# Patient Record
Sex: MALE | Race: WHITE | NOT HISPANIC OR LATINO | URBAN - METROPOLITAN AREA
[De-identification: names, ages, dates, MRNs, and addresses within clinical notes are randomized per-mention and may not be internally consistent; named-entity substitution may affect disease eponyms.]

---

## 2017-05-28 ENCOUNTER — EMERGENCY (EMERGENCY)
Facility: HOSPITAL | Age: 27
LOS: 1 days | Discharge: HOME | End: 2017-05-28
Attending: EMERGENCY MEDICINE | Admitting: EMERGENCY MEDICINE
Payer: COMMERCIAL

## 2017-05-28 VITALS
RESPIRATION RATE: 16 BRPM | HEART RATE: 84 BPM | WEIGHT: 179.9 LBS | DIASTOLIC BLOOD PRESSURE: 76 MMHG | TEMPERATURE: 98 F | OXYGEN SATURATION: 100 % | SYSTOLIC BLOOD PRESSURE: 128 MMHG
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
## 2017-05-28 DIAGNOSIS — S69.92XA UNSPECIFIED INJURY OF LEFT WRIST, HAND AND FINGER(S), INITIAL ENCOUNTER: ICD-10-CM
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
## 2017-05-28 PROCEDURE — 99283 EMERGENCY DEPT VISIT LOW MDM: CPT | Mod: 25

## 2017-05-28 PROCEDURE — 99284 EMERGENCY DEPT VISIT MOD MDM: CPT

## 2017-05-28 PROCEDURE — 73130 X-RAY EXAM OF HAND: CPT | Mod: 26,LT

## 2017-05-28 PROCEDURE — 90471 IMMUNIZATION ADMIN: CPT

## 2017-05-28 PROCEDURE — 90715 TDAP VACCINE 7 YRS/> IM: CPT

## 2017-05-28 PROCEDURE — 10120 INC&RMVL FB SUBQ TISS SMPL: CPT | Mod: F2

## 2017-05-28 PROCEDURE — 10120 INC&RMVL FB SUBQ TISS SMPL: CPT

## 2017-05-28 PROCEDURE — 73130 X-RAY EXAM OF HAND: CPT

## 2017-05-28 RX ORDER — TETANUS TOXOID, REDUCED DIPHTHERIA TOXOID AND ACELLULAR PERTUSSIS VACCINE, ADSORBED 5; 2.5; 8; 8; 2.5 [IU]/.5ML; [IU]/.5ML; UG/.5ML; UG/.5ML; UG/.5ML
0.5 SUSPENSION INTRAMUSCULAR ONCE
Qty: 0 | Refills: 0 | Status: COMPLETED | OUTPATIENT
Start: 2017-05-28 | End: 2017-05-28

## 2017-05-28 RX ADMIN — TETANUS TOXOID, REDUCED DIPHTHERIA TOXOID AND ACELLULAR PERTUSSIS VACCINE, ADSORBED 0.5 MILLILITER(S): 5; 2.5; 8; 8; 2.5 SUSPENSION INTRAMUSCULAR at 20:39

## 2017-05-28 RX ADMIN — Medication 1 TABLET(S): at 20:39

## 2017-05-28 NOTE — ED PROVIDER NOTE - CHPI ED SYMPTOMS NEG
no bleeding at site/no bleeding/no drainage/no vomiting/no redness/no red streaks/no chills/no purulent drainage/no fever

## 2017-05-28 NOTE — ED PROVIDER NOTE - PLAN OF CARE
Return to the ED for any new or worsening symptoms  Take your medication as prescribed  Finish all antibiotics prescribed  Keep wound clean and dry  Apply Bacitracin to affected site 2 times a day   Follow up for a recheck in 2-3 days

## 2017-05-28 NOTE — ED PROVIDER NOTE - ATTENDING CONTRIBUTION TO CARE
Pt is a 27 yo male who presents to the ED with a cc of fish hook to left hand 3rd digit.  Pt reports that he was cleaning up after a fishing trip and hanging his hooks on the wall when his hand slipped and he caught it on a fish hook.  Pt is right hand dominant.  Denies numbness or tingling in left hand.  Unsure of date of last Tetanus.  On exam pt resting comfortably in bed NAD.  Heart RRR, lungs CTA, abd soft NT/ND.  Left hand fish hook noted to distal aspect of 3rd digit lateral palmar aspect.  No bleeding.  Sensation intact to distal aspect of finger with capillary refill less then 2 seconds.  FROM +radial pulse.  Agree with above plan of care

## 2017-05-28 NOTE — ED ADULT NURSE NOTE - OBJECTIVE STATEMENT
Pt presents to the ED s/p fishing hook in the left 3rd finger, + sensation, + capillary refill < 2 sec, skin warm and dry, + mobility.

## 2017-05-28 NOTE — ED PROVIDER NOTE - OBJECTIVE STATEMENT
25 y/o male presents to ED c/o fish hook stuck in left 3rd digit x 1 hour. States he accidentally got his finger stuck in the hook while fishing. Denies any other complaints. States he otherwise feels good. Denies n/v, f/c, chest pain, sob, numbness, tingling.

## 2017-05-28 NOTE — ED PROVIDER NOTE - MEDICAL DECISION MAKING DETAILS
tetanus today- risks and benefits discussed prior to administration of TDAP. fish hook removed in ED, xray r/o fb, bacitracin and sterile dressing applied in ED.  Please follow up with your Primary MD in 24-48 hr. Augmentin twice a day x 7 days. Keep area clean and dry, daily dressing changes, apply bacitracin. Watch for local signs of infection such as increased redness, warmth, swelling, discharge, pain. Return to ED immediately if condition worsens or development of nausea, vomiting, fever, chills, or diarrhea.  Seek immediate medical care for any new/worsening signs or symptoms.

## 2017-05-28 NOTE — ED PROVIDER NOTE - PHYSICAL EXAMINATION
Left hand- FROM all fingers. 3rd digit tendons intact. NVI, good distal pulses BL, capillary refill <2 sec BL, sensation intact throughout, 5/5 motor x 4 extremities. No redness, no warmth, no swelling, no bony tenderness, no local signs of infection.

## 2017-05-28 NOTE — ED PROVIDER NOTE - CARE PLAN
Principal Discharge DX:	Fish hook injury of finger of left hand, initial encounter  Instructions for follow-up, activity and diet:	Return to the ED for any new or worsening symptoms  Take your medication as prescribed  Finish all antibiotics prescribed  Keep wound clean and dry  Apply Bacitracin to affected site 2 times a day   Follow up for a recheck in 2-3 days

## 2019-04-11 ENCOUNTER — APPOINTMENT (OUTPATIENT)
Dept: INTERNAL MEDICINE | Facility: CLINIC | Age: 29
End: 2019-04-11

## 2020-05-04 ENCOUNTER — APPOINTMENT (OUTPATIENT)
Dept: INTERNAL MEDICINE | Facility: CLINIC | Age: 30
End: 2020-05-04
Payer: MEDICAID

## 2020-05-04 VITALS
BODY MASS INDEX: 24.38 KG/M2 | SYSTOLIC BLOOD PRESSURE: 105 MMHG | DIASTOLIC BLOOD PRESSURE: 66 MMHG | HEIGHT: 72 IN | WEIGHT: 180 LBS | OXYGEN SATURATION: 96 % | TEMPERATURE: 98.7 F | HEART RATE: 76 BPM

## 2020-05-04 DIAGNOSIS — L29.0 PRURITUS ANI: ICD-10-CM

## 2020-05-04 DIAGNOSIS — Z00.00 ENCOUNTER FOR GENERAL ADULT MEDICAL EXAMINATION W/OUT ABNORMAL FINDINGS: ICD-10-CM

## 2020-05-04 PROCEDURE — 99213 OFFICE O/P EST LOW 20 MIN: CPT

## 2020-05-04 PROCEDURE — G0444 DEPRESSION SCREEN ANNUAL: CPT

## 2020-05-04 RX ORDER — HYDROCORTISONE 25 MG/G
2.5 CREAM TOPICAL TWICE DAILY
Qty: 1 | Refills: 0 | Status: ACTIVE | COMMUNITY
Start: 2020-05-04 | End: 1900-01-01

## 2020-05-04 NOTE — PHYSICAL EXAM
[Normal] : no acute distress, well nourished, well developed and well-appearing [FreeTextEntry1] : small external hemmrhoid--no other findings

## 2020-05-04 NOTE — HISTORY OF PRESENT ILLNESS
[de-identified] : reports perianal irritation and pruritis\par tried otc topical tx [FreeTextEntry1] : anal pruritis

## 2020-08-17 ENCOUNTER — TRANSCRIPTION ENCOUNTER (OUTPATIENT)
Age: 30
End: 2020-08-17

## 2020-08-19 ENCOUNTER — APPOINTMENT (OUTPATIENT)
Dept: ORTHOPEDIC SURGERY | Facility: CLINIC | Age: 30
End: 2020-08-19
Payer: MEDICAID

## 2020-08-19 VITALS
HEART RATE: 58 BPM | WEIGHT: 185 LBS | SYSTOLIC BLOOD PRESSURE: 118 MMHG | HEIGHT: 72 IN | TEMPERATURE: 98.1 F | DIASTOLIC BLOOD PRESSURE: 75 MMHG | BODY MASS INDEX: 25.06 KG/M2

## 2020-08-19 DIAGNOSIS — Z86.11 PERSONAL HISTORY OF TUBERCULOSIS: ICD-10-CM

## 2020-08-19 DIAGNOSIS — M66.241 SPONTANEOUS RUPTURE OF EXTENSOR TENDONS, RIGHT HAND: ICD-10-CM

## 2020-08-19 DIAGNOSIS — Z80.9 FAMILY HISTORY OF MALIGNANT NEOPLASM, UNSPECIFIED: ICD-10-CM

## 2020-08-19 DIAGNOSIS — Z78.9 OTHER SPECIFIED HEALTH STATUS: ICD-10-CM

## 2020-08-19 PROCEDURE — 99203 OFFICE O/P NEW LOW 30 MIN: CPT

## 2020-08-19 RX ORDER — IBUPROFEN 200 MG/1
TABLET, COATED ORAL
Refills: 0 | Status: ACTIVE | COMMUNITY

## 2020-08-19 NOTE — ADDENDUM
[FreeTextEntry1] : I, Galilea Kirkland, acted solely as a scribe for Dr. Tinajero on this date 08/19/2020.

## 2020-08-19 NOTE — DISCUSSION/SUMMARY
[FreeTextEntry1] : He has findings consistent with a possible right middle finger radial sagittal band rupture.\par \par I had a discussion regarding today's visit, the diagnosis, and treatment recommendations / options. At this time, I recommended an MRI to evaluate for a middle finger radial sagittal band rupture. He will follow up after his MRI to review the results and discuss treatment recommendations. \par \par The patient has agreed to this plan of management and has expressed full understanding.  All questions were fully answered to the patient's satisfaction.\par \par Over 50% of the time spent with the patient was on counseling the patient on the above diagnosis, treatment plan and prognosis.

## 2020-08-19 NOTE — PHYSICAL EXAM
[de-identified] : He reports that he had x-rays and was told that there were no fractures or abnormalities. [de-identified] : - Constitutional: This is a male in no obvious distress.  \par - Psych: Patient is alert and oriented to person, place and time.  Patient has a normal mood and affect.\par - Cardiovascular: Normal pulses throughout the upper extremities.  No significant varicosities are noted in the upper extremities. \par - Neuro: Strength and sensation are intact throughout the upper extremities.  Patient has normal coordination.\par - Respiratory:  Patient exhibits no evidence of shortness of breath or difficulty breathing.\par - Skin: No rashes, lesions, or other abnormalities are noted in the upper extremities.\par \par ---\par  \par Examination of his right hand demonstrates mild swelling and ecchymosis palmarly.  There is mild tenderness in this region.  He has full extension of the digits with mild loss of terminal flexion.  There is no evidence of a trigger finger.  There is mild swelling along the middle finger MCP joint dorsally.  He has what appears to be ulnar subluxation of the central extensor tendon of the middle finger with terminal flexion.  There is no obvious tenderness along the radial sagittal band of the middle finger.  He is neurovascularly intact distally.

## 2020-08-19 NOTE — END OF VISIT
[FreeTextEntry3] : This note was written by Galilea Kirkland on 08/19/2020 acting solely as a scribe for Dr. Ashok Tinajero.\par  \par All medical record entries made by the Scribe were at my, Dr. Ashok Tinajero, direction and personally dictated by me on 08/19/2020. I have personally reviewed the chart and agree that the record accurately reflects my personal performance of the history, physical exam, assessment and plan.

## 2020-08-19 NOTE — HISTORY OF PRESENT ILLNESS
[Right] : right hand dominant [FreeTextEntry1] : He comes in today for evaluation of right hand pain, which began 1 week ago. He cannot cite any injury or trauma. He reports that 7 days ago he woke up and noticed clicking in his right wrist when opening and closing his hand. Then 3 days ago he was surfing and noted pain with applying pressure to the hand upon standing on his board. He notes that he felt a pop in the hand. He has also developed mild ecchymosis along the palm of the hand. His symptoms have slightly improved since initial onset. Currently he describes intermittent pain along the base of his right middle finger that is exacerbated with forming a fist. He denies any numbness/tingling. He has been taking Advil as needed, noting mild relief of his pain. He rates his pain a 2 out of 10 at this time, and a 7 out of 10 with movement.

## 2020-08-25 PROBLEM — M77.9 TENDINITIS OF LEFT HAND: Status: ACTIVE | Noted: 2020-08-25

## 2020-08-25 PROBLEM — D21.11 BENIGN NEOPLASM OF SOFT TISSUES OF UPPER LIMB, RIGHT: Status: ACTIVE | Noted: 2020-08-25

## 2020-08-26 ENCOUNTER — APPOINTMENT (OUTPATIENT)
Dept: ORTHOPEDIC SURGERY | Facility: CLINIC | Age: 30
End: 2020-08-26
Payer: MEDICAID

## 2020-08-26 VITALS — TEMPERATURE: 98.1 F

## 2020-08-26 DIAGNOSIS — M77.9 ENTHESOPATHY, UNSPECIFIED: ICD-10-CM

## 2020-08-26 DIAGNOSIS — D21.11 BENIGN NEOPLASM OF CONNECTIVE AND OTHER SOFT TISSUE OF RIGHT UPPER LIMB, INCLUDING SHOULDER: ICD-10-CM

## 2020-08-26 PROCEDURE — 99214 OFFICE O/P EST MOD 30 MIN: CPT

## 2020-08-26 NOTE — ADDENDUM
[FreeTextEntry1] : I, Galilea Kirkland, acted solely as a scribe for Dr. Tinajero on this date 08/26/2020.

## 2020-08-26 NOTE — HISTORY OF PRESENT ILLNESS
[Right] : right hand dominant [FreeTextEntry1] : Follow-up regarding right middle finger.  See note from when he was seen in the office 1 week ago.  I ordered an MRI.  He comes in to review the results and discuss treatment recommendations.\par \par He returns today and reports persistent pain in the palm of his right hand with stretching or applying pressure to the hand. His right middle finger pain has been improving since his last visit and he is able to form a tighter fist.

## 2020-08-26 NOTE — END OF VISIT
[FreeTextEntry3] : This note was written by Galilea Kirkland on 08/26/2020 acting solely as a scribe for Dr. Ashok Tinajero.\par  \par All medical record entries made by the Scribe were at my, Dr. Ashok Tinajero, direction and personally dictated by me on 08/26/2020. I have personally reviewed the chart and agree that the record accurately reflects my personal performance of the history, physical exam, assessment and plan.

## 2020-08-26 NOTE — DISCUSSION/SUMMARY
[FreeTextEntry1] : I reviewed the MRI results with him.  I had a discussion regarding today's visit, the diagnosis, and treatment recommendations / options. At this time, I recommended he consider surgical intervention.  I told him that this may represent a small loose body or possibly a benign MRI of the throat such as a giant cell tumor of tendon sheath.  He told me that he would like to think about this.  I told him that if he needs to come he can get a second opinion.  If he decides to proceed, he will call the office.  If he decides to hold off on surgery, I recommended follow-up in 1 month for reevaluation.  I did briefly discussed the surgery with him and associated risks and benefits.\par \par The patient has agreed to this plan of management and has expressed full understanding.  All questions were fully answered to the patient's satisfaction.\par \par I spent at least 25 minutes of face to face time with the patient.  Over 50% of the time spent with the patient was on counseling the patient on the above diagnosis, treatment plan and prognosis.

## 2020-08-26 NOTE — PHYSICAL EXAM
[de-identified] : - Constitutional: This is a male in no obvious distress.  \par - Psych: Patient is alert and oriented to person, place and time.  Patient has a normal mood and affect.\par - Cardiovascular: Normal pulses throughout the upper extremities.  No significant varicosities are noted in the upper extremities. \par - Neuro: Strength and sensation are intact throughout the upper extremities.  Patient has normal coordination.\par - Respiratory:  Patient exhibits no evidence of shortness of breath or difficulty breathing.\par - Skin: No rashes, lesions, or other abnormalities are noted in the upper extremities.\par \par ---\par  \par Examination of his right hand demonstrates no residual swelling.  There is mild tenderness just radial to the middle finger flexor tendon distal to the carpal tunnel.  He has full flexion and extension.  There are no palpable masses.  There is no obvious clicking or triggering.  He remains neurovascularly intact distally. [de-identified] : I reviewed the MRI of his right hand.  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  There is associated severe tenosynovitis and slight mass effect on the flexor tendons.  Also, evidence of nonaggressive intraosseous lesion of the middle finger middle phalangeal shaft, probable enchondroma.

## 2021-01-05 ENCOUNTER — TRANSCRIPTION ENCOUNTER (OUTPATIENT)
Age: 31
End: 2021-01-05

## 2021-02-04 ENCOUNTER — TRANSCRIPTION ENCOUNTER (OUTPATIENT)
Age: 31
End: 2021-02-04

## 2023-05-16 ENCOUNTER — APPOINTMENT (OUTPATIENT)
Dept: UROLOGY | Facility: CLINIC | Age: 33
End: 2023-05-16
Payer: MEDICAID

## 2023-05-16 VITALS
TEMPERATURE: 98 F | HEART RATE: 61 BPM | RESPIRATION RATE: 14 BRPM | HEIGHT: 72 IN | WEIGHT: 170 LBS | BODY MASS INDEX: 23.03 KG/M2 | DIASTOLIC BLOOD PRESSURE: 69 MMHG | SYSTOLIC BLOOD PRESSURE: 114 MMHG

## 2023-05-16 DIAGNOSIS — R39.89 OTHER SYMPTOMS AND SIGNS INVOLVING THE GENITOURINARY SYSTEM: ICD-10-CM

## 2023-05-16 DIAGNOSIS — R10.30 LOWER ABDOMINAL PAIN, UNSPECIFIED: ICD-10-CM

## 2023-05-16 PROCEDURE — 99203 OFFICE O/P NEW LOW 30 MIN: CPT

## 2023-05-16 NOTE — PHYSICAL EXAM
[Normal Appearance] : normal appearance [General Appearance - In No Acute Distress] : no acute distress [FreeTextEntry1] : normal peripheral circulation  [] : no respiratory distress [Abdomen Tenderness] : non-tender [Abdomen Soft] : soft [Abdomen Hernia] : no hernia was discovered [Costovertebral Angle Tenderness] : no ~M costovertebral angle tenderness [Urethral Meatus] : meatus normal [Penis Abnormality] : normal circumcised penis [Rectal Exam - Seminal Vesicles] : the seminal vesicles were normal [Epididymis] : the epididymides were normal [Testes Mass (___cm)] : there were no testicular masses [Testes Tenderness] : no tenderness of the testes [Prostate Tenderness] : the prostate was not tender [No Prostate Nodules] : no prostate nodules [Prostate Size ___ (0-4)] : prostate size [unfilled] (scale: 0-4) [Normal Station and Gait] : the gait and station were normal for the patient's age [Skin Color & Pigmentation] : normal skin color and pigmentation [No Focal Deficits] : no focal deficits [Oriented To Time, Place, And Person] : oriented to person, place, and time

## 2023-05-16 NOTE — HISTORY OF PRESENT ILLNESS
[FreeTextEntry1] : 32-year-old male presents for groin pain.\par For last 3 weeks has been having left groin pain after ejaculation.\par Complaining of discomfort at the tip of the penis last few months.\par Feeling slightly better with both complaints. \par Also mentions him being active with surfing and Basketball and after overexertion develops back pain that radiates to left groin.\par No personal history or family history of kidney stone. \par Has sexual activity including masturbation 2 x a week. \par STD panel was negative with PCP.\par Urine test showed protein.\par Reports good stream, urinates 4 to 5 x or so during the day and nocturia of 1 x. \par Denies hesitancy, straining, intermittency, urgency, incontinence, sense of incomplete emptying.\par Denies dysuria, hematuria, lower abdominal or flank pain, fever, chills or rigors.\par Normal libido and erections.\par Denies constipation.\par No family history of Prostate cancer. \par \par TB test as child was positive. \par

## 2023-05-16 NOTE — ASSESSMENT
[FreeTextEntry1] : Reviewed records.\par Discussed labs. \par 1/6/2023:\par Creatinine: 0.97\par Urinalysis: Trace protein\par \par Groin pain:\par Urethral pain:\par Will get Urinalysis and Urine culture post LISANDRO.\par Will get scrotal Ultrasound. \par Will get renal and Bladder Ultrasound.\par \par Return to office after Ultrasound.

## 2023-05-16 NOTE — LETTER BODY
[Dear  ___] : Dear  [unfilled], [Consult Letter:] : I had the pleasure of evaluating your patient, [unfilled]. [( Thank you for referring [unfilled] for consultation for _____ )] : Thank you for referring [unfilled] for consultation for [unfilled] [Please see my note below.] : Please see my note below. [Consult Closing:] : Thank you very much for allowing me to participate in the care of this patient.  If you have any questions, please do not hesitate to contact me. [Sincerely,] : Sincerely, [FreeTextEntry3] : Juan Sandhu MD\par  of Urology\par [facility]\par \par [facility]\par Offices:\par [address] [facility]", [facility]\par [address] [address]\par [address] [facility], [address]\par \par TEL: [phone]\par FAX: [phone]

## 2023-05-17 LAB
APPEARANCE: CLEAR
BACTERIA: NEGATIVE /HPF
BILIRUBIN URINE: NEGATIVE
BLOOD URINE: NEGATIVE
CAST: 0 /LPF
COLOR: YELLOW
EPITHELIAL CELLS: 0 /HPF
GLUCOSE QUALITATIVE U: NEGATIVE MG/DL
KETONES URINE: NEGATIVE MG/DL
LEUKOCYTE ESTERASE URINE: NEGATIVE
MICROSCOPIC-UA: NORMAL
NITRITE URINE: NEGATIVE
PH URINE: 7.5
PROTEIN URINE: NEGATIVE MG/DL
RED BLOOD CELLS URINE: 1 /HPF
SPECIFIC GRAVITY URINE: 1.02
UROBILINOGEN URINE: 1 MG/DL
WHITE BLOOD CELLS URINE: 0 /HPF

## 2023-05-18 LAB — BACTERIA UR CULT: NORMAL

## 2023-05-30 ENCOUNTER — NON-APPOINTMENT (OUTPATIENT)
Age: 33
End: 2023-05-30

## 2023-06-09 ENCOUNTER — APPOINTMENT (OUTPATIENT)
Dept: UROLOGY | Facility: CLINIC | Age: 33
End: 2023-06-09
Payer: MEDICAID

## 2023-06-09 PROCEDURE — 99214 OFFICE O/P EST MOD 30 MIN: CPT

## 2023-06-09 RX ORDER — DOXYCYCLINE 100 MG/1
100 CAPSULE ORAL TWICE DAILY
Qty: 28 | Refills: 0 | Status: ACTIVE | COMMUNITY
Start: 2023-06-09 | End: 1900-01-01

## 2023-06-13 ENCOUNTER — RX CHANGE (OUTPATIENT)
Age: 33
End: 2023-06-13

## 2023-06-15 ENCOUNTER — NON-APPOINTMENT (OUTPATIENT)
Age: 33
End: 2023-06-15

## 2023-06-15 DIAGNOSIS — N41.0 ACUTE PROSTATITIS: ICD-10-CM

## 2023-06-15 RX ORDER — SULFAMETHOXAZOLE AND TRIMETHOPRIM 800; 160 MG/1; MG/1
800-160 TABLET ORAL TWICE DAILY
Qty: 28 | Refills: 0 | Status: ACTIVE | COMMUNITY
Start: 2023-06-15 | End: 1900-01-01

## 2023-06-22 NOTE — ASSESSMENT
[FreeTextEntry1] : Reviewed records.\par Discussed labs and imaging. \par \par Renal bladder ultrasound(5/23/2023):\par 7 x 6 x 8 mm right lower pole simple cyst.\par Prostate volume: 20 cc.\par Prevoid volume: 314 mL.\par Postvoid volume: 30 mL.\par \par Scrotal ultrasound(5/23/2023):\par Unremarkable scrotal ultrasound.\par \par Prostatitis:\par Discussed concern for prostatitis.  Discussed treatment options.\par Patient will try Doxycycline.\par Discussed persist will need cystoscopy.\par Recommended to see Dr. Mahoney at [facility], [facility].

## 2023-06-22 NOTE — LETTER BODY
[Dear  ___] : Dear  [unfilled], [Courtesy Letter:] : I had the pleasure of seeing your patient, [unfilled], in my office today. [Please see my note below.] : Please see my note below. [Sincerely,] : Sincerely, [FreeTextEntry3] : Juan Sandhu MD\par  of Urology\par [facility]\par \par [facility]\par Offices:\par [address] [facility], [facility]\par [address] [address]\par [address] [facility], [address]\par \par TEL: [phone]\par FAX: [phone]

## 2023-06-22 NOTE — HISTORY OF PRESENT ILLNESS
[FreeTextEntry1] : 32-year-old male presents for follow-up.\par Complaining of lumps along groin and painful ejaculation.\par Had renal, bladder and scrotal ultrasound.\par \par Seen on 5/16/2023 for groin pain.\par Had been having left groin pain after ejaculation.\par Complained of discomfort at the tip of the penis last few months.\par Felt slightly better with both complaints. \par Also mentioned him being active with surfing and Basketball and after overexertion develops back pain that radiates to left groin.\par No personal history or family history of kidney stone. \par Has sexual activity including masturbation 2 x a week. \par STD panel was negative with PCP.\par Urine test showed protein.\par Reported good stream, urinates 4 to 5 x or so during the day and nocturia of 1 x. \par Denied hesitancy, straining, intermittency, urgency, incontinence, sense of incomplete emptying.\par Denied dysuria, hematuria, lower abdominal or flank pain, fever, chills or rigors.\par Normal libido and erections.\par Denied constipation.\par No family history of Prostate cancer. \par \par TB test as child was positive. \par

## 2023-06-26 ENCOUNTER — NON-APPOINTMENT (OUTPATIENT)
Age: 33
End: 2023-06-26

## 2023-09-20 ENCOUNTER — APPOINTMENT (OUTPATIENT)
Dept: UROLOGY | Facility: CLINIC | Age: 33
End: 2023-09-20
Payer: MEDICAID

## 2023-09-20 VITALS — DIASTOLIC BLOOD PRESSURE: 75 MMHG | HEART RATE: 66 BPM | SYSTOLIC BLOOD PRESSURE: 119 MMHG

## 2023-09-20 DIAGNOSIS — M62.89 OTHER SPECIFIED DISORDERS OF MUSCLE: ICD-10-CM

## 2023-09-20 PROCEDURE — 99214 OFFICE O/P EST MOD 30 MIN: CPT

## 2023-10-20 ENCOUNTER — APPOINTMENT (OUTPATIENT)
Dept: UROLOGY | Facility: CLINIC | Age: 33
End: 2023-10-20

## 2023-12-15 ENCOUNTER — APPOINTMENT (OUTPATIENT)
Dept: UROLOGY | Facility: CLINIC | Age: 33
End: 2023-12-15